# Patient Record
Sex: MALE | Race: WHITE | URBAN - METROPOLITAN AREA
[De-identification: names, ages, dates, MRNs, and addresses within clinical notes are randomized per-mention and may not be internally consistent; named-entity substitution may affect disease eponyms.]

---

## 2021-05-13 ENCOUNTER — OFFICE VISIT (OUTPATIENT)
Dept: URBAN - METROPOLITAN AREA CLINIC 29 | Facility: CLINIC | Age: 86
End: 2021-05-13
Payer: COMMERCIAL

## 2021-05-13 DIAGNOSIS — H25.12 AGE-RELATED NUCLEAR CATARACT, LEFT EYE: Primary | ICD-10-CM

## 2021-05-13 DIAGNOSIS — Z96.1 PRESENCE OF PSEUDOPHAKIA: ICD-10-CM

## 2021-05-13 DIAGNOSIS — H52.223 REGULAR ASTIGMATISM, BILATERAL: ICD-10-CM

## 2021-05-13 DIAGNOSIS — H52.4 PRESBYOPIA: ICD-10-CM

## 2021-05-13 PROCEDURE — 99214 OFFICE O/P EST MOD 30 MIN: CPT | Performed by: OPTOMETRIST

## 2021-05-13 ASSESSMENT — VISUAL ACUITY
OD: 20/25
OS: 20/40

## 2021-05-13 ASSESSMENT — INTRAOCULAR PRESSURE
OD: 13
OS: 13

## 2022-09-26 ENCOUNTER — OFFICE VISIT (OUTPATIENT)
Dept: URBAN - METROPOLITAN AREA CLINIC 28 | Facility: CLINIC | Age: 87
End: 2022-09-26
Payer: COMMERCIAL

## 2022-09-26 DIAGNOSIS — H25.812 COMBINED FORMS OF AGE-RELATED CATARACT, LEFT EYE: Primary | ICD-10-CM

## 2022-09-26 DIAGNOSIS — H52.4 PRESBYOPIA: ICD-10-CM

## 2022-09-26 PROCEDURE — 92004 COMPRE OPH EXAM NEW PT 1/>: CPT | Performed by: OPTOMETRIST

## 2022-09-26 ASSESSMENT — VISUAL ACUITY
OS: 20/40
OD: 20/25

## 2022-09-26 ASSESSMENT — INTRAOCULAR PRESSURE
OD: 13
OS: 13

## 2022-09-26 ASSESSMENT — KERATOMETRY
OD: 42.50
OS: 42.50

## 2022-09-26 NOTE — IMPRESSION/PLAN
Impression: Combined forms of age-related cataract, left eye: H25.812. Plan: Educated on cataracts and impact on vision and symptoms. Continue to monitor with annual dilation. Pt not interested in surgery at this time.

## 2022-09-27 NOTE — IMPRESSION/PLAN
Impression: Age-related nuclear cataract, left eye: H25.12. Plan: Cataracts account for the patient's complaints. Discussed all risks, benefits, procedures and recovery. Patient understands changing glasses will not improve vision.
Impression: Presence of pseudophakia: Z96.1. Right. Plan: No treatment is required at this time.
General